# Patient Record
Sex: MALE | Race: BLACK OR AFRICAN AMERICAN | URBAN - METROPOLITAN AREA
[De-identification: names, ages, dates, MRNs, and addresses within clinical notes are randomized per-mention and may not be internally consistent; named-entity substitution may affect disease eponyms.]

---

## 2023-06-23 ENCOUNTER — APPOINTMENT (RX ONLY)
Dept: URBAN - METROPOLITAN AREA CLINIC 132 | Facility: CLINIC | Age: 43
Setting detail: DERMATOLOGY
End: 2023-06-23

## 2023-06-23 DIAGNOSIS — L73.0 ACNE KELOID: ICD-10-CM

## 2023-06-23 DIAGNOSIS — L90.5 SCAR CONDITIONS AND FIBROSIS OF SKIN: ICD-10-CM

## 2023-06-23 PROCEDURE — 99203 OFFICE O/P NEW LOW 30 MIN: CPT

## 2023-06-23 PROCEDURE — ? ADDITIONAL NOTES

## 2023-06-23 PROCEDURE — ? COUNSELING

## 2023-06-23 ASSESSMENT — LOCATION DETAILED DESCRIPTION DERM
LOCATION DETAILED: LEFT SUPERIOR POSTERIOR NECK
LOCATION DETAILED: RIGHT SUPERIOR POSTERIOR NECK

## 2023-06-23 ASSESSMENT — LOCATION ZONE DERM: LOCATION ZONE: NECK

## 2023-06-23 ASSESSMENT — LOCATION SIMPLE DESCRIPTION DERM: LOCATION SIMPLE: POSTERIOR NECK

## 2023-06-23 NOTE — PROCEDURE: ADDITIONAL NOTES
Render Risk Assessment In Note?: yes
Detail Level: Simple
Additional Notes: No cosmetic charge for Micro needling. Will charge insurance

## 2023-10-31 ENCOUNTER — APPOINTMENT (RX ONLY)
Dept: URBAN - METROPOLITAN AREA CLINIC 129 | Facility: CLINIC | Age: 43
Setting detail: DERMATOLOGY
End: 2023-10-31

## 2023-10-31 DIAGNOSIS — L73.1 PSEUDOFOLLICULITIS BARBAE: ICD-10-CM

## 2023-10-31 DIAGNOSIS — L73.0 ACNE KELOID: ICD-10-CM

## 2023-10-31 PROCEDURE — 99212 OFFICE O/P EST SF 10 MIN: CPT

## 2023-10-31 PROCEDURE — ? LASER HAIR REMOVAL

## 2023-10-31 PROCEDURE — ? COUNSELING

## 2023-10-31 PROCEDURE — 96999 UNLISTED SPEC DERM SVC/PX: CPT

## 2023-10-31 ASSESSMENT — LOCATION ZONE DERM
LOCATION ZONE: FACE
LOCATION ZONE: NECK

## 2023-10-31 ASSESSMENT — LOCATION DETAILED DESCRIPTION DERM
LOCATION DETAILED: LEFT SUPERIOR POSTERIOR NECK
LOCATION DETAILED: LEFT CHIN
LOCATION DETAILED: MID POSTERIOR NECK

## 2023-10-31 ASSESSMENT — LOCATION SIMPLE DESCRIPTION DERM
LOCATION SIMPLE: CHIN
LOCATION SIMPLE: POSTERIOR NECK

## 2023-10-31 NOTE — PROCEDURE: LASER HAIR REMOVAL
Total Pulses: 68
Render Post-Care In The Note: No
Spot Size: 18 mm
Total Area (Optional- Include Units): 30cm2
Number Of Prepaid Treatments (Will Not Render If 0): 0
Anesthesia Type: 1% lidocaine with epinephrine
Pre-Procedure: Prior to proceeding the treatment areas were cleaned and all present put on their eye protection.
Eye Shield Text: Given the treatment area eye shields were inserted prior to treatment.
Topical Anesthesia Type: BLT cream (benzocaine 20%, lidocaine 10%, tetracaine 10%)
Post-Procedure Care: Immediate endpoint: perifollicular erythema and edema. Vaseline and ice applied. Post care reviewed with patient.
Shaving (Optional): The patient was shaved in the office prior to the procedure
Fluence (Will Not Render If 0): 20
Cooling: DCD 30/20
Laser Type: Nd:Yag 1064nm
Consent: Written consent obtained, risks reviewed including but not limited to crusting, scabbing, blistering, scarring, darker or lighter pigmentary change, paradoxical hair regrowth, incomplete removal of hair and infection.
Fluence (Will Not Render If 0): 10
Cooling: DCD setting
Price (Use Numbers Only, No Special Characters Or $): 295
Tolerated Procedure (Optional): Tolerated Well
Post-Care Instructions: I reviewed with the patient in detail post-care instructions. Patient should avoid sun for a minimum of 4 weeks before and after treatment.
Were Eye Shields Employed?: Yes
Treatment Number: 1
Detail Level: Detailed

## 2023-11-29 ENCOUNTER — APPOINTMENT (RX ONLY)
Dept: URBAN - METROPOLITAN AREA CLINIC 129 | Facility: CLINIC | Age: 43
Setting detail: DERMATOLOGY
End: 2023-11-29

## 2023-11-29 DIAGNOSIS — L90.5 SCAR CONDITIONS AND FIBROSIS OF SKIN: ICD-10-CM

## 2023-11-29 DIAGNOSIS — L73.1 PSEUDOFOLLICULITIS BARBAE: ICD-10-CM

## 2023-11-29 DIAGNOSIS — L73.0 ACNE KELOID: ICD-10-CM

## 2023-11-29 PROCEDURE — ? COUNSELING

## 2023-11-29 PROCEDURE — ? LASER HAIR REMOVAL

## 2023-11-29 PROCEDURE — 99213 OFFICE O/P EST LOW 20 MIN: CPT

## 2023-11-29 PROCEDURE — ? ADDITIONAL NOTES

## 2023-11-29 ASSESSMENT — LOCATION ZONE DERM
LOCATION ZONE: FACE
LOCATION ZONE: NECK
LOCATION ZONE: SCALP

## 2023-11-29 ASSESSMENT — LOCATION DETAILED DESCRIPTION DERM
LOCATION DETAILED: LEFT CHIN
LOCATION DETAILED: MID POSTERIOR NECK
LOCATION DETAILED: RIGHT OCCIPITAL SCALP
LOCATION DETAILED: LEFT SUPERIOR POSTERIOR NECK

## 2023-11-29 ASSESSMENT — LOCATION SIMPLE DESCRIPTION DERM
LOCATION SIMPLE: CHIN
LOCATION SIMPLE: POSTERIOR NECK
LOCATION SIMPLE: POSTERIOR SCALP

## 2023-11-29 NOTE — PROCEDURE: LASER HAIR REMOVAL
Total Pulses: 68
Render Post-Care In The Note: No
Spot Size: 18 mm
Total Area (Optional- Include Units): 30cm2
Number Of Prepaid Treatments (Will Not Render If 0): 0
Anesthesia Type: 1% lidocaine with epinephrine
Pre-Procedure: Prior to proceeding the treatment areas were cleaned and all present put on their eye protection.
Eye Shield Text: Given the treatment area eye shields were inserted prior to treatment.
Topical Anesthesia Type: BLT cream (benzocaine 20%, lidocaine 10%, tetracaine 10%)
Post-Procedure Care: Immediate endpoint: perifollicular erythema and edema. Vaseline and ice applied. Post care reviewed with patient.
Shaving (Optional): The patient was shaved in the office prior to the procedure
Fluence (Will Not Render If 0): 20
Cooling: DCD 30/20
Laser Type: Nd:Yag 1064nm
Consent: Written consent obtained, risks reviewed including but not limited to crusting, scabbing, blistering, scarring, darker or lighter pigmentary change, paradoxical hair regrowth, incomplete removal of hair and infection.
Fluence (Will Not Render If 0): 12
Cooling: DCD setting
Price (Use Numbers Only, No Special Characters Or $): 505
Tolerated Procedure (Optional): Tolerated Well
Post-Care Instructions: I reviewed with the patient in detail post-care instructions. Patient should avoid sun for a minimum of 4 weeks before and after treatment.
Were Eye Shields Employed?: Yes
Pulse Duration (Include Units): 10
Treatment Number: 2
Detail Level: Detailed

## 2023-11-29 NOTE — PROCEDURE: ADDITIONAL NOTES
Render Risk Assessment In Note?: yes
Additional Notes: Will schedule excision scar PRN.
Detail Level: Simple

## 2024-02-06 ENCOUNTER — APPOINTMENT (RX ONLY)
Dept: URBAN - METROPOLITAN AREA CLINIC 129 | Facility: CLINIC | Age: 44
Setting detail: DERMATOLOGY
End: 2024-02-06

## 2024-02-06 VITALS — HEART RATE: 63 BPM | SYSTOLIC BLOOD PRESSURE: 140 MMHG | DIASTOLIC BLOOD PRESSURE: 77 MMHG

## 2024-02-06 DIAGNOSIS — L90.5 SCAR CONDITIONS AND FIBROSIS OF SKIN: ICD-10-CM

## 2024-02-06 PROCEDURE — 12032 INTMD RPR S/A/T/EXT 2.6-7.5: CPT

## 2024-02-06 PROCEDURE — 11424 EXC H-F-NK-SP B9+MARG 3.1-4: CPT

## 2024-02-06 PROCEDURE — ? SCAR REVISION AND EXCISION

## 2024-02-06 ASSESSMENT — LOCATION ZONE DERM: LOCATION ZONE: SCALP

## 2024-02-06 ASSESSMENT — LOCATION SIMPLE DESCRIPTION DERM: LOCATION SIMPLE: POSTERIOR SCALP

## 2024-02-06 ASSESSMENT — LOCATION DETAILED DESCRIPTION DERM: LOCATION DETAILED: RIGHT OCCIPITAL SCALP

## 2024-02-06 NOTE — PROCEDURE: SCAR REVISION AND EXCISION
Detail Level: Detailed
Size Of Lesion In Cm: 3.5
X Size Of Lesion In Cm (Optional): 1.5
Size Of Margin In Cm: 0.2
Excision Depth: galea
Scalpel Size: 15 blade
Excision Method: Fusiform
Anesthesia Type: 1% lidocaine with epinephrine
Additional Anesthesia Volume In Cc: 6
Hemostasis: Electrocautery
Estimated Blood Loss (Cc): minimal
Repair Type: Intermediate
Intermediate / Complex Repair - Final Wound Length In Cm: 4
Complex Requirements: Extensive Undermining Performed?: No
Undermining Type: Entire Wound
Debridement Text: The wound edges were debrided prior to proceeding with the closure to facilitate wound healing.
Helical Rim Text: The closure involved the helical rim.
Vermilion Border Text: The closure involved the vermilion border.
Nostril Rim Text: The closure involved the nostril rim.
Retention Suture Text: Retention sutures were placed to support the closure and prevent dehiscence.
Repair Anesthesia Method: local infiltration
Repair Anesthesia Volume In Cc: 9
Deep Sutures: 4-0 Maxon
Epidermal Sutures: staples
Epidermal Closure: simple interrupted
Wound Care: Petrolatum
Dressing: dry sterile dressing
Suture Removal: 14 days
Path Notes (To The Dermatopathologist): Please check margins.
Medical Necessity Clause: This procedure was medically necessary because the lesion that was treated was:
Consent was obtained from the patient. The risks and benefits to therapy were discussed in detail. Specifically, the risks of infection, scarring, bleeding, prolonged wound healing, incomplete removal, allergy to anesthesia, nerve injury and recurrence were addressed. Prior to the procedure, the treatment site was clearly identified and confirmed by the patient. All components of Universal Protocol/PAUSE Rule completed.
Post-Care Instructions: I reviewed with the patient in detail post-care instructions. Patient is not to engage in any heavy lifting, exercise, or swimming for the next 14 days. Should the patient develop any fevers, chills, bleeding, severe pain patient will contact the office immediately.
Add Superficial Fascia When Documenting Dermal Sutures?: Yes
Billing Type: Third-Party Bill

## 2024-02-20 ENCOUNTER — APPOINTMENT (RX ONLY)
Dept: URBAN - METROPOLITAN AREA CLINIC 129 | Facility: CLINIC | Age: 44
Setting detail: DERMATOLOGY
End: 2024-02-20

## 2024-02-20 DIAGNOSIS — Z48.02 ENCOUNTER FOR REMOVAL OF SUTURES: ICD-10-CM

## 2024-02-20 PROCEDURE — ? STAPLE REMOVAL

## 2024-02-20 PROCEDURE — 99024 POSTOP FOLLOW-UP VISIT: CPT

## 2024-02-20 ASSESSMENT — LOCATION SIMPLE DESCRIPTION DERM: LOCATION SIMPLE: SCALP

## 2024-02-20 ASSESSMENT — LOCATION DETAILED DESCRIPTION DERM: LOCATION DETAILED: RIGHT CENTRAL PARIETAL SCALP

## 2024-02-20 ASSESSMENT — LOCATION ZONE DERM: LOCATION ZONE: SCALP

## 2024-02-20 NOTE — PROCEDURE: STAPLE REMOVAL
Detail Level: Detailed
Add 22420 Cpt? (Important Note: In 2017 The Use Of 83410 Is Being Tracked By Cms To Determine Future Global Period Reimbursement For Global Periods): yes